# Patient Record
Sex: FEMALE | Race: WHITE | NOT HISPANIC OR LATINO | URBAN - METROPOLITAN AREA
[De-identification: names, ages, dates, MRNs, and addresses within clinical notes are randomized per-mention and may not be internally consistent; named-entity substitution may affect disease eponyms.]

---

## 2022-03-21 ENCOUNTER — NEW PATIENT (OUTPATIENT)
Dept: URBAN - METROPOLITAN AREA CLINIC 53 | Facility: CLINIC | Age: 72
End: 2022-03-21

## 2022-03-21 DIAGNOSIS — H25.813: ICD-10-CM

## 2022-03-21 DIAGNOSIS — H52.4: ICD-10-CM

## 2022-03-21 DIAGNOSIS — H43.811: ICD-10-CM

## 2022-03-21 PROCEDURE — 92134 CPTRZ OPH DX IMG PST SGM RTA: CPT

## 2022-03-21 PROCEDURE — 92004 COMPRE OPH EXAM NEW PT 1/>: CPT

## 2022-03-21 PROCEDURE — 92015 DETERMINE REFRACTIVE STATE: CPT

## 2022-03-21 ASSESSMENT — VISUAL ACUITY
OU_CC: J1
OD_CC: 20/40
OS_PH: 20/70
OS_GLARE: >20/400
OD_GLARE: 20/50
OS_CC: 20/150
OD_PH: 20/40+2
OD_GLARE: 20/50

## 2022-03-21 ASSESSMENT — TONOMETRY
OD_IOP_MMHG: 17
OS_IOP_MMHG: 16

## 2022-03-21 NOTE — PATIENT DISCUSSION
VISUALLY SIGNIFICANT: OS>OD; Informed patient that their cataract is visually significant and that surgery is recommended to improve patient's visual acuity and/or glare symptoms. RBAs of surgery discussed and questions answered. Patient to schedule biometry measurements and surgery.

## 2022-03-24 ENCOUNTER — DIAGNOSTICS ONLY (OUTPATIENT)
Dept: URBAN - METROPOLITAN AREA CLINIC 53 | Facility: CLINIC | Age: 72
End: 2022-03-24

## 2022-03-24 DIAGNOSIS — H25.813: ICD-10-CM

## 2022-03-24 PROCEDURE — 92136 OPHTHALMIC BIOMETRY: CPT

## 2022-03-24 PROCEDURE — 92025IOL CORNEAL TOPOGRAPHY PREMIUM IOL

## 2022-03-24 ASSESSMENT — KERATOMETRY
OS_AXISANGLE_DEGREES: 70
OD_AXISANGLE2_DEGREES: 15
OS_K2POWER_DIOPTERS: 43.75
OD_K1POWER_DIOPTERS: 44.12
OS_K1POWER_DIOPTERS: 44.00
OS_AXISANGLE2_DEGREES: 160
OD_AXISANGLE_DEGREES: 105
OD_K2POWER_DIOPTERS: 43.50

## 2022-04-11 ENCOUNTER — PRE-OP/H&P (OUTPATIENT)
Dept: URBAN - METROPOLITAN AREA CLINIC 53 | Facility: CLINIC | Age: 72
End: 2022-04-11

## 2022-04-11 DIAGNOSIS — H25.812: ICD-10-CM

## 2022-04-11 PROCEDURE — PREOP PRE OP VISIT

## 2022-04-11 ASSESSMENT — KERATOMETRY
OS_AXISANGLE_DEGREES: 70
OS_K2POWER_DIOPTERS: 43.75
OD_K2POWER_DIOPTERS: 43.50
OS_AXISANGLE2_DEGREES: 160
OD_AXISANGLE_DEGREES: 105
OD_AXISANGLE2_DEGREES: 15
OD_K1POWER_DIOPTERS: 44.12
OS_K1POWER_DIOPTERS: 44.00

## 2022-04-11 ASSESSMENT — TONOMETRY
OD_IOP_MMHG: 16
OS_IOP_MMHG: 16

## 2022-04-11 ASSESSMENT — VISUAL ACUITY
OS_CC: 20/150
OS_PH: 20/70
OD_CC: 20/50
OD_PH: 20/40

## 2022-04-11 NOTE — PATIENT DISCUSSION
Multi-Focal pamphlet and [employer] card given to patient. Educated patient on pros and cons of MF platform.

## 2022-04-11 NOTE — PATIENT DISCUSSION
CATARACT SURGERY PLANNER - MULTIFOCAL IOL/+FEMTO: Phacoemulsification with IOL: Eye: left|DOS: 4/27/22|Model: [id number]|Power: 8|Target: MF|Femto: yes|Arcs: 32 @ 70 ; 32 @ 250|Visc: duet|Omidria: yes|10% Phenylephrine: no|Epi-shugarcaine: yes|Phaco Setting: dense|BSS+: no|Trypan Blue: no|CTR: no|Olive Tip: no|Atropine: no|Pupilloplasty: no|Notes: MF info given.

## 2022-04-27 ENCOUNTER — SURGERY/PROCEDURE (OUTPATIENT)
Dept: URBAN - METROPOLITAN AREA SURGERY 16 | Facility: SURGERY | Age: 72
End: 2022-04-27

## 2022-04-27 ENCOUNTER — POST-OP (OUTPATIENT)
Dept: URBAN - METROPOLITAN AREA CLINIC 53 | Facility: CLINIC | Age: 72
End: 2022-04-27

## 2022-04-27 DIAGNOSIS — Z98.42: ICD-10-CM

## 2022-04-27 DIAGNOSIS — Z96.1: ICD-10-CM

## 2022-04-27 DIAGNOSIS — H25.812: ICD-10-CM

## 2022-04-27 PROCEDURE — 99199PPRE PREMIUM VISION PACKAGE

## 2022-04-27 PROCEDURE — 66984 XCAPSL CTRC RMVL W/O ECP: CPT

## 2022-04-27 ASSESSMENT — KERATOMETRY
OD_K2POWER_DIOPTERS: 43.50
OD_AXISANGLE_DEGREES: 105
OS_AXISANGLE_DEGREES: 70
OS_K2POWER_DIOPTERS: 43.75
OD_K1POWER_DIOPTERS: 44.12
OS_K1POWER_DIOPTERS: 44.00
OS_AXISANGLE2_DEGREES: 160
OD_AXISANGLE2_DEGREES: 15

## 2022-04-27 ASSESSMENT — TONOMETRY: OS_IOP_MMHG: 24

## 2022-04-27 ASSESSMENT — VISUAL ACUITY
OS_SC: 20/100
OS_PH: 20/60+1

## 2022-05-02 ENCOUNTER — PRE-OP/H&P (OUTPATIENT)
Dept: URBAN - METROPOLITAN AREA CLINIC 53 | Facility: CLINIC | Age: 72
End: 2022-05-02

## 2022-05-02 DIAGNOSIS — H25.11: ICD-10-CM

## 2022-05-02 DIAGNOSIS — Z98.42: ICD-10-CM

## 2022-05-02 DIAGNOSIS — Z96.1: ICD-10-CM

## 2022-05-02 PROCEDURE — 92136 - 2N OPHTHALMIC BIOMETRY BY PARTIAL COHERENCE INTERFEROMETRY WITH INTRAOCULAR LENS POWER CALCULATION

## 2022-05-02 PROCEDURE — PREOP PRE OP VISIT

## 2022-05-02 ASSESSMENT — VISUAL ACUITY
OS_SC: J2
OD_PH: 20/40
OS_SC: 20/40
OS_SC: J2
OD_CC: 20/50-1
OS_PH: 20/30

## 2022-05-02 ASSESSMENT — TONOMETRY
OD_IOP_MMHG: 18
OS_IOP_MMHG: 18

## 2022-05-02 NOTE — PATIENT DISCUSSION
CATARACT SURGERY PLANNER - MULTIFOCAL IOL/+FEMTO: Phacoemulsification with IOL: Eye: RIGHT|DOS: 5/11/22|Model: [id number]|Power: 9.5|Target: MF|Femto: YES|Arcs: 33 @ 105 ; 33 @ 285|[place]itter: Jerardo Selma: DUET|Omidria: YES|10% Phenylephrine: NO|Epi-shugarcaine: YES|Phaco Setting: STD|BSS+: NO|Trypan Blue: NO|CTR: NO|Olive Tip: NO|Atropine: NO|Pupilloplasty: NO|Notes: NO.

## 2022-05-11 ENCOUNTER — POST-OP (OUTPATIENT)
Dept: URBAN - METROPOLITAN AREA CLINIC 53 | Facility: CLINIC | Age: 72
End: 2022-05-11

## 2022-05-11 ENCOUNTER — SURGERY/PROCEDURE (OUTPATIENT)
Dept: URBAN - METROPOLITAN AREA SURGERY 16 | Facility: SURGERY | Age: 72
End: 2022-05-11

## 2022-05-11 DIAGNOSIS — Z96.1: ICD-10-CM

## 2022-05-11 DIAGNOSIS — H25.11: ICD-10-CM

## 2022-05-11 DIAGNOSIS — Z98.41: ICD-10-CM

## 2022-05-11 PROCEDURE — 99199PPRE PREMIUM VISION PACKAGE

## 2022-05-11 PROCEDURE — 66984 XCAPSL CTRC RMVL W/O ECP: CPT

## 2022-05-11 ASSESSMENT — TONOMETRY: OD_IOP_MMHG: 26

## 2022-05-11 ASSESSMENT — VISUAL ACUITY: OD_SC: 20/40-1

## 2022-05-16 ENCOUNTER — POST-OP (OUTPATIENT)
Dept: URBAN - METROPOLITAN AREA CLINIC 53 | Facility: CLINIC | Age: 72
End: 2022-05-16

## 2022-05-16 DIAGNOSIS — Z98.41: ICD-10-CM

## 2022-05-16 DIAGNOSIS — Z96.1: ICD-10-CM

## 2022-05-16 PROCEDURE — 99024 POSTOP FOLLOW-UP VISIT: CPT

## 2022-05-16 ASSESSMENT — VISUAL ACUITY
OS_SC: 20/50
OS_PH: 20/20-2
OU_SC: J1+@16INCHES
OD_SC: 20/25
OU_SC: J1@18INCHES

## 2022-05-16 ASSESSMENT — TONOMETRY
OD_IOP_MMHG: 15
OS_IOP_MMHG: 15

## 2022-06-20 ENCOUNTER — POST-OP (OUTPATIENT)
Dept: URBAN - METROPOLITAN AREA CLINIC 53 | Facility: CLINIC | Age: 72
End: 2022-06-20

## 2022-06-20 DIAGNOSIS — Z98.41: ICD-10-CM

## 2022-06-20 DIAGNOSIS — Z98.42: ICD-10-CM

## 2022-06-20 DIAGNOSIS — Z96.1: ICD-10-CM

## 2022-06-20 PROCEDURE — 92015 DETERMINE REFRACTIVE STATE: CPT

## 2022-06-20 PROCEDURE — 99024 POSTOP FOLLOW-UP VISIT: CPT

## 2022-06-20 ASSESSMENT — VISUAL ACUITY
OU_SC: 20/25-1 SLOW
OD_SC: 20/25-2
OS_SC: 20/50-2
OD_GLARE: 20/30
OS_SC: J2-2
OS_GLARE: 20/30
OD_SC: J1+-1
OU_SC: J1+-2
OU_SC: 20/25

## 2022-06-20 ASSESSMENT — KERATOMETRY
OD_AXISANGLE2_DEGREES: 97
OD_K1POWER_DIOPTERS: 43.25
OS_K2POWER_DIOPTERS: 44.00
OD_AXISANGLE_DEGREES: 007
OS_AXISANGLE2_DEGREES: 75
OS_K1POWER_DIOPTERS: 43.50
OD_K2POWER_DIOPTERS: 44.00
OS_AXISANGLE_DEGREES: 165

## 2022-06-20 ASSESSMENT — TONOMETRY
OS_IOP_MMHG: 16
OD_IOP_MMHG: 16

## 2022-09-19 ENCOUNTER — CLINIC PROCEDURE ONLY (OUTPATIENT)
Dept: URBAN - METROPOLITAN AREA CLINIC 53 | Facility: CLINIC | Age: 72
End: 2022-09-19

## 2022-09-19 DIAGNOSIS — H26.492: ICD-10-CM

## 2022-09-19 PROCEDURE — 66821 AFTER CATARACT LASER SURGERY: CPT

## 2022-09-19 ASSESSMENT — KERATOMETRY
OS_AXISANGLE2_DEGREES: 75
OD_AXISANGLE_DEGREES: 007
OS_K1POWER_DIOPTERS: 43.50
OD_K2POWER_DIOPTERS: 44.00
OS_K2POWER_DIOPTERS: 44.00
OD_AXISANGLE2_DEGREES: 97
OS_AXISANGLE_DEGREES: 165
OD_K1POWER_DIOPTERS: 43.25

## 2022-09-19 ASSESSMENT — TONOMETRY
OD_IOP_MMHG: 15
OS_IOP_MMHG: 15

## 2022-09-19 ASSESSMENT — VISUAL ACUITY
OS_SC: 20/50
OD_SC: 20/25
OS_PH: 20/25

## 2022-09-19 NOTE — PATIENT DISCUSSION
YAG OS done today. If patient has PMN left over from surgery instructed to use QD OS x 1 week. Patient to call if she does not. Will ERx Pred Acetate OS BID x 1 week as PO gtts.

## 2022-09-19 NOTE — PROCEDURE NOTE: CLINICAL
PROCEDURE NOTE: YAG Capsulotomy OS. Diagnosis: Posterior Capsular Opacification (PCO). Prep: Mydriacil 1% and Phenylephrine 2.5%. Prior to treatment, the risks/benefits/alternatives were discussed. The patient wished to proceed with procedure. Consent was signed. Proparacaine and brominidine were placed into the operative eye after the eye was dilated. Power = 3.8mJ. Number of pulses = 49. Patient tolerated procedure well and there were no complications. Post Laser instructions given. mEa Lucio

## 2022-09-19 NOTE — PATIENT DISCUSSION
PCO ([id number]): Visually significant PCO present on exam today. Recommend YAG laser capsulotomy to improve vision and decrease glare symptoms. RBAs of procedure discussed. Patient agrees and wishes to proceed.

## 2022-10-12 ENCOUNTER — ESTABLISHED PATIENT (OUTPATIENT)
Dept: URBAN - METROPOLITAN AREA CLINIC 49 | Facility: CLINIC | Age: 72
End: 2022-10-12

## 2022-10-12 DIAGNOSIS — H43.811: ICD-10-CM

## 2022-10-12 PROCEDURE — 92014 COMPRE OPH EXAM EST PT 1/>: CPT

## 2022-10-12 ASSESSMENT — KERATOMETRY
OD_AXISANGLE2_DEGREES: 97
OD_K1POWER_DIOPTERS: 43.25
OS_AXISANGLE2_DEGREES: 75
OD_AXISANGLE_DEGREES: 007
OS_AXISANGLE_DEGREES: 165
OD_K2POWER_DIOPTERS: 44.00
OS_K2POWER_DIOPTERS: 44.00
OS_K1POWER_DIOPTERS: 43.50

## 2022-10-12 ASSESSMENT — VISUAL ACUITY
OS_PH: 20/25-2
OS_SC: 20/40
OD_GLARE: 20/25
OD_GLARE: 20/30
OD_SC: 20/30-1

## 2022-10-12 ASSESSMENT — TONOMETRY
OS_IOP_MMHG: 16
OD_IOP_MMHG: 16

## 2022-10-12 NOTE — PATIENT DISCUSSION
Patient to look out for any changes within the next 4 weeks and to call immediately if any new flashes of light or dark spot starting to come from top corner of sight corner. Advised patient to not do anything heavy like lifting or bending for a week until everything settles down.

## 2022-10-12 NOTE — PATIENT DISCUSSION
Pt has appointment on 10/19/22 with Dr. Juana Balderas for PO YAG. Dr. Juana Balderas to dilate OD on that exam to follow up with todays 10/12/22 problem visit. PVD OD.

## 2022-10-19 ENCOUNTER — COMPREHENSIVE EXAM (OUTPATIENT)
Dept: URBAN - METROPOLITAN AREA CLINIC 53 | Facility: CLINIC | Age: 72
End: 2022-10-19

## 2022-10-19 DIAGNOSIS — H43.811: ICD-10-CM

## 2022-10-19 DIAGNOSIS — Z98.890: ICD-10-CM

## 2022-10-19 PROCEDURE — 92134 CPTRZ OPH DX IMG PST SGM RTA: CPT

## 2022-10-19 ASSESSMENT — KERATOMETRY
OD_K1POWER_DIOPTERS: 43.25
OS_AXISANGLE_DEGREES: 165
OS_AXISANGLE2_DEGREES: 75
OS_K1POWER_DIOPTERS: 43.50
OD_AXISANGLE2_DEGREES: 97
OD_K2POWER_DIOPTERS: 44.00
OD_AXISANGLE_DEGREES: 007
OS_K2POWER_DIOPTERS: 44.00

## 2022-10-19 ASSESSMENT — VISUAL ACUITY
OD_SC: 20/30-1
OD_GLARE: 20/25
OU_SC: J1"14IN
OS_GLARE: 20/30
OS_GLARE: 20/30
OD_GLARE: 20/30-1
OS_SC: 20/30

## 2022-10-19 ASSESSMENT — TONOMETRY
OD_IOP_MMHG: 17
OS_IOP_MMHG: 17

## 2022-12-14 ENCOUNTER — COMPREHENSIVE EXAM (OUTPATIENT)
Dept: URBAN - METROPOLITAN AREA CLINIC 53 | Facility: CLINIC | Age: 72
End: 2022-12-14

## 2022-12-14 PROCEDURE — 92014 COMPRE OPH EXAM EST PT 1/>: CPT

## 2022-12-14 ASSESSMENT — VISUAL ACUITY
OS_PH: 20/20-2
OS_SC: 20/30
OU_SC: J1 @ 14"
OD_SC: 20/30+2
OD_PH: 20/20-2

## 2022-12-14 ASSESSMENT — TONOMETRY
OD_IOP_MMHG: 18
OS_IOP_MMHG: 17

## 2022-12-14 ASSESSMENT — KERATOMETRY
OS_AXISANGLE2_DEGREES: 75
OD_K1POWER_DIOPTERS: 43.25
OD_AXISANGLE_DEGREES: 007
OS_K2POWER_DIOPTERS: 44.00
OS_AXISANGLE_DEGREES: 165
OD_AXISANGLE2_DEGREES: 97
OD_K2POWER_DIOPTERS: 44.00
OS_K1POWER_DIOPTERS: 43.50

## 2023-06-06 ENCOUNTER — ESTABLISHED PATIENT (OUTPATIENT)
Dept: URBAN - METROPOLITAN AREA CLINIC 53 | Facility: CLINIC | Age: 73
End: 2023-06-06

## 2023-06-06 DIAGNOSIS — H43.813: ICD-10-CM

## 2023-06-06 DIAGNOSIS — H35.373: ICD-10-CM

## 2023-06-06 PROCEDURE — 92014 COMPRE OPH EXAM EST PT 1/>: CPT

## 2023-06-06 ASSESSMENT — KERATOMETRY
OS_K2POWER_DIOPTERS: 44.00
OD_K1POWER_DIOPTERS: 43.25
OD_AXISANGLE2_DEGREES: 97
OD_K2POWER_DIOPTERS: 44.00
OS_K1POWER_DIOPTERS: 43.50
OS_AXISANGLE2_DEGREES: 75
OS_AXISANGLE_DEGREES: 165
OD_AXISANGLE_DEGREES: 007

## 2023-06-06 ASSESSMENT — VISUAL ACUITY
OS_PH: 20/25
OS_SC: 20/40
OD_SC: 20/40
OD_PH: 20/30

## 2023-06-06 ASSESSMENT — TONOMETRY
OD_IOP_MMHG: 16
OS_IOP_MMHG: 16

## 2023-10-02 ENCOUNTER — COMPREHENSIVE EXAM (OUTPATIENT)
Dept: URBAN - METROPOLITAN AREA CLINIC 53 | Facility: CLINIC | Age: 73
End: 2023-10-02

## 2023-10-02 DIAGNOSIS — H35.373: ICD-10-CM

## 2023-10-02 DIAGNOSIS — H43.813: ICD-10-CM

## 2023-10-02 PROCEDURE — 92014 COMPRE OPH EXAM EST PT 1/>: CPT

## 2023-10-02 PROCEDURE — 92134 CPTRZ OPH DX IMG PST SGM RTA: CPT

## 2023-10-02 ASSESSMENT — TONOMETRY
OS_IOP_MMHG: 18
OD_IOP_MMHG: 17

## 2023-10-02 ASSESSMENT — KERATOMETRY
OS_K1POWER_DIOPTERS: 43.50
OS_AXISANGLE_DEGREES: 165
OD_K2POWER_DIOPTERS: 44.00
OD_AXISANGLE2_DEGREES: 97
OS_AXISANGLE2_DEGREES: 75
OD_K1POWER_DIOPTERS: 43.25
OS_K2POWER_DIOPTERS: 44.00
OD_AXISANGLE_DEGREES: 007

## 2023-10-02 ASSESSMENT — VISUAL ACUITY
OD_GLARE: 20/25
OD_PH: 20/25-1
OD_GLARE: 20/25
OD_SC: 20/30
OS_SC: 20/30
OS_PH: 20/20

## 2024-10-07 ENCOUNTER — COMPREHENSIVE EXAM (OUTPATIENT)
Dept: URBAN - METROPOLITAN AREA CLINIC 53 | Facility: CLINIC | Age: 74
End: 2024-10-07

## 2024-10-07 DIAGNOSIS — H35.373: ICD-10-CM

## 2024-10-07 DIAGNOSIS — H16.223: ICD-10-CM

## 2024-10-07 DIAGNOSIS — H43.813: ICD-10-CM

## 2024-10-07 PROCEDURE — 99214 OFFICE O/P EST MOD 30 MIN: CPT
